# Patient Record
Sex: MALE | Race: WHITE | NOT HISPANIC OR LATINO | Employment: STUDENT | URBAN - METROPOLITAN AREA
[De-identification: names, ages, dates, MRNs, and addresses within clinical notes are randomized per-mention and may not be internally consistent; named-entity substitution may affect disease eponyms.]

---
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
## 2024-12-10 ENCOUNTER — HOSPITAL ENCOUNTER (EMERGENCY)
Facility: OTHER | Age: 24
Discharge: HOME | End: 2024-12-10
Attending: STUDENT IN AN ORGANIZED HEALTH CARE EDUCATION/TRAINING PROGRAM
Payer: COMMERCIAL

## 2024-12-10 VITALS
WEIGHT: 176 LBS | HEIGHT: 68 IN | HEART RATE: 93 BPM | TEMPERATURE: 98 F | OXYGEN SATURATION: 98 % | RESPIRATION RATE: 19 BRPM | SYSTOLIC BLOOD PRESSURE: 144 MMHG | BODY MASS INDEX: 26.67 KG/M2 | DIASTOLIC BLOOD PRESSURE: 69 MMHG

## 2024-12-10 DIAGNOSIS — R00.0 TACHYCARDIA: Primary | ICD-10-CM

## 2024-12-10 LAB
ALBUMIN SERPL BCP-MCNC: 4.7 G/DL (ref 3.5–5.2)
ALP SERPL-CCNC: 71 U/L (ref 40–150)
ALT SERPL W/O P-5'-P-CCNC: 61 U/L (ref 10–44)
ANION GAP SERPL CALC-SCNC: 13 MMOL/L (ref 8–16)
AST SERPL-CCNC: 30 U/L (ref 10–40)
BASOPHILS # BLD AUTO: 0.03 K/UL (ref 0–0.2)
BASOPHILS NFR BLD: 0.4 % (ref 0–1.9)
BILIRUB SERPL-MCNC: 0.4 MG/DL (ref 0.1–1)
BUN SERPL-MCNC: 16 MG/DL (ref 6–20)
CALCIUM SERPL-MCNC: 9.9 MG/DL (ref 8.7–10.5)
CHLORIDE SERPL-SCNC: 107 MMOL/L (ref 95–110)
CO2 SERPL-SCNC: 21 MMOL/L (ref 23–29)
CREAT SERPL-MCNC: 1 MG/DL (ref 0.5–1.4)
CTP QC/QA: YES
CTP QC/QA: YES
DIFFERENTIAL METHOD BLD: ABNORMAL
EOSINOPHIL # BLD AUTO: 0.1 K/UL (ref 0–0.5)
EOSINOPHIL NFR BLD: 1.4 % (ref 0–8)
ERYTHROCYTE [DISTWIDTH] IN BLOOD BY AUTOMATED COUNT: 12.9 % (ref 11.5–14.5)
EST. GFR  (NO RACE VARIABLE): >60 ML/MIN/1.73 M^2
GLUCOSE SERPL-MCNC: 115 MG/DL (ref 70–110)
HCT VFR BLD AUTO: 47.5 % (ref 40–54)
HGB BLD-MCNC: 17.2 G/DL (ref 14–18)
IMM GRANULOCYTES # BLD AUTO: 0.02 K/UL (ref 0–0.04)
IMM GRANULOCYTES NFR BLD AUTO: 0.3 % (ref 0–0.5)
LYMPHOCYTES # BLD AUTO: 1.9 K/UL (ref 1–4.8)
LYMPHOCYTES NFR BLD: 24.4 % (ref 18–48)
MAGNESIUM SERPL-MCNC: 1.8 MG/DL (ref 1.6–2.6)
MCH RBC QN AUTO: 30.6 PG (ref 27–31)
MCHC RBC AUTO-ENTMCNC: 36.2 G/DL (ref 32–36)
MCV RBC AUTO: 85 FL (ref 82–98)
MONOCYTES # BLD AUTO: 0.6 K/UL (ref 0.3–1)
MONOCYTES NFR BLD: 7.5 % (ref 4–15)
NEUTROPHILS # BLD AUTO: 5.1 K/UL (ref 1.8–7.7)
NEUTROPHILS NFR BLD: 66 % (ref 38–73)
NRBC BLD-RTO: 0 /100 WBC
PLATELET # BLD AUTO: 226 K/UL (ref 150–450)
PMV BLD AUTO: 11.5 FL (ref 9.2–12.9)
POC MOLECULAR INFLUENZA A AGN: NEGATIVE
POC MOLECULAR INFLUENZA B AGN: NEGATIVE
POTASSIUM SERPL-SCNC: 3.9 MMOL/L (ref 3.5–5.1)
PROT SERPL-MCNC: 8.1 G/DL (ref 6–8.4)
RBC # BLD AUTO: 5.62 M/UL (ref 4.6–6.2)
SARS-COV-2 RDRP RESP QL NAA+PROBE: NEGATIVE
SODIUM SERPL-SCNC: 141 MMOL/L (ref 136–145)
TSH SERPL DL<=0.005 MIU/L-ACNC: 1.5 UIU/ML (ref 0.4–4)
WBC # BLD AUTO: 7.65 K/UL (ref 3.9–12.7)

## 2024-12-10 PROCEDURE — 96374 THER/PROPH/DIAG INJ IV PUSH: CPT

## 2024-12-10 PROCEDURE — 93005 ELECTROCARDIOGRAM TRACING: CPT

## 2024-12-10 PROCEDURE — 63600175 PHARM REV CODE 636 W HCPCS: Performed by: STUDENT IN AN ORGANIZED HEALTH CARE EDUCATION/TRAINING PROGRAM

## 2024-12-10 PROCEDURE — 25000003 PHARM REV CODE 250: Performed by: STUDENT IN AN ORGANIZED HEALTH CARE EDUCATION/TRAINING PROGRAM

## 2024-12-10 PROCEDURE — 83735 ASSAY OF MAGNESIUM: CPT | Performed by: STUDENT IN AN ORGANIZED HEALTH CARE EDUCATION/TRAINING PROGRAM

## 2024-12-10 PROCEDURE — 96361 HYDRATE IV INFUSION ADD-ON: CPT

## 2024-12-10 PROCEDURE — 80053 COMPREHEN METABOLIC PANEL: CPT | Performed by: STUDENT IN AN ORGANIZED HEALTH CARE EDUCATION/TRAINING PROGRAM

## 2024-12-10 PROCEDURE — 99284 EMERGENCY DEPT VISIT MOD MDM: CPT | Mod: 25

## 2024-12-10 PROCEDURE — 84443 ASSAY THYROID STIM HORMONE: CPT | Performed by: STUDENT IN AN ORGANIZED HEALTH CARE EDUCATION/TRAINING PROGRAM

## 2024-12-10 PROCEDURE — 87635 SARS-COV-2 COVID-19 AMP PRB: CPT | Performed by: STUDENT IN AN ORGANIZED HEALTH CARE EDUCATION/TRAINING PROGRAM

## 2024-12-10 PROCEDURE — 85025 COMPLETE CBC W/AUTO DIFF WBC: CPT | Performed by: STUDENT IN AN ORGANIZED HEALTH CARE EDUCATION/TRAINING PROGRAM

## 2024-12-10 PROCEDURE — 93010 ELECTROCARDIOGRAM REPORT: CPT | Mod: ,,, | Performed by: INTERNAL MEDICINE

## 2024-12-10 RX ORDER — KETOROLAC TROMETHAMINE 30 MG/ML
15 INJECTION, SOLUTION INTRAMUSCULAR; INTRAVENOUS
Status: COMPLETED | OUTPATIENT
Start: 2024-12-10 | End: 2024-12-10

## 2024-12-10 RX ORDER — ACETAMINOPHEN 500 MG
1000 TABLET ORAL
Status: COMPLETED | OUTPATIENT
Start: 2024-12-10 | End: 2024-12-10

## 2024-12-10 RX ADMIN — ACETAMINOPHEN 1000 MG: 500 TABLET, FILM COATED ORAL at 06:12

## 2024-12-10 RX ADMIN — KETOROLAC TROMETHAMINE 15 MG: 30 INJECTION, SOLUTION INTRAMUSCULAR; INTRAVENOUS at 06:12

## 2024-12-10 RX ADMIN — SODIUM CHLORIDE, POTASSIUM CHLORIDE, SODIUM LACTATE AND CALCIUM CHLORIDE 1000 ML: 600; 310; 30; 20 INJECTION, SOLUTION INTRAVENOUS at 07:12

## 2024-12-10 RX ADMIN — SODIUM CHLORIDE, POTASSIUM CHLORIDE, SODIUM LACTATE AND CALCIUM CHLORIDE 1000 ML: 600; 310; 30; 20 INJECTION, SOLUTION INTRAVENOUS at 05:12

## 2024-12-10 NOTE — Clinical Note
"Jaime Moise"Arpit was seen and treated in our emergency department on 12/10/2024.  He may return to school on 12/12/2024.      If you have any questions or concerns, please don't hesitate to call.       RN"

## 2024-12-10 NOTE — ED PROVIDER NOTES
Encounter Date: 12/10/2024       History     Chief Complaint   Patient presents with    Tachycardia     Pt seen at [facility] with HR between 150-190s. Pt feels lightheaded, diaphoretic. History of tachycardia where he wore a halter monitor in the past but everything was normal.     Patient is a 24-year-old with a prior history of tachycardia, ADHD, MDD who presents with tachycardia.  Patient was in his usual state of health until approximately 1:00 p.m. this afternoon when his Apple watch notified him that his heart rate was in the 130s while resting.  He reports at this time he felt the sensation of palpitations and a mild headache.  He went on with his day in his heart rate got as high as 190s on his apple watch.  He reports feeling headaches, nauseous, chest tightness and shortness of breath when his heart rate was high.  He says he has had this happen in the past but never as high as 190s.  He has received a full cardiac workup including an outpatient cardiac monitor and echo in 2020 that did not find a reason for his episodes of tachycardia.  Patient reports taking his home dose of ADHD medication.  He denies taking an increased dose.  Patient did not take his short-acting booster medication today.  He did drink 1 energy drink which he reports is his usual caffeine intake.  He has been finals and is having increased stress but reports he has been able to eat and sleep normally.  He denies illicit drug use.    The history is provided by the patient and medical records. No  was used.     Review of patient's allergies indicates:  No Known Allergies  History reviewed. No pertinent past medical history.  History reviewed. No pertinent surgical history.  No family history on file.  Social History     Tobacco Use    Smoking status: Never    Smokeless tobacco: Never   Substance Use Topics    Alcohol use: Yes     Review of Systems   Constitutional:  Negative for fever.   HENT:  Negative  for sore throat.    Respiratory:  Positive for shortness of breath.    Cardiovascular:  Positive for chest pain.   Gastrointestinal:  Positive for nausea.   Genitourinary:  Negative for dysuria.   Musculoskeletal:  Negative for back pain.   Skin:  Negative for rash.   Neurological:  Negative for weakness.       Physical Exam     Initial Vitals [12/10/24 1708]   BP Pulse Resp Temp SpO2   (!) 104/52 (!) 152 18 98.2 °F (36.8 °C) 95 %      MAP       --         Physical Exam    Constitutional: Vital signs are normal. No distress.   HENT:   Head: Normocephalic and atraumatic.   Eyes: EOM are normal.   Neck:   Normal range of motion.  Cardiovascular:  Regular rhythm and intact distal pulses.           Tachycardic   Pulmonary/Chest: Breath sounds normal. No respiratory distress.   Abdominal: Abdomen is soft. There is no abdominal tenderness.   Musculoskeletal:         General: No edema. Normal range of motion.      Cervical back: Normal range of motion.     Neurological: He is alert and oriented to person, place, and time. GCS score is 15. GCS eye subscore is 4. GCS verbal subscore is 5. GCS motor subscore is 6.   Skin: Skin is warm and dry.         ED Course   Procedures  Labs Reviewed   CBC W/ AUTO DIFFERENTIAL - Abnormal       Result Value    WBC 7.65      RBC 5.62      Hemoglobin 17.2      Hematocrit 47.5      MCV 85      MCH 30.6      MCHC 36.2 (*)     RDW 12.9      Platelets 226      MPV 11.5      Immature Granulocytes 0.3      Gran # (ANC) 5.1      Immature Grans (Abs) 0.02      Lymph # 1.9      Mono # 0.6      Eos # 0.1      Baso # 0.03      nRBC 0      Gran % 66.0      Lymph % 24.4      Mono % 7.5      Eosinophil % 1.4      Basophil % 0.4      Differential Method Automated     COMPREHENSIVE METABOLIC PANEL - Abnormal    Sodium 141      Potassium 3.9      Chloride 107      CO2 21 (*)     Glucose 115 (*)     BUN 16      Creatinine 1.0      Calcium 9.9      Total Protein 8.1      Albumin 4.7      Total Bilirubin 0.4       Alkaline Phosphatase 71      AST 30      ALT 61 (*)     eGFR >60      Anion Gap 13     MAGNESIUM    Magnesium 1.8     TSH    TSH 1.500     SARS-COV-2 RDRP GENE    POC Rapid COVID Negative       Acceptable Yes     POCT INFLUENZA A/B MOLECULAR    POC Molecular Influenza A Ag Negative      POC Molecular Influenza B Ag Negative       Acceptable Yes       EKG Readings: (Independently Interpreted)   Sinus tachycardia, rate of 121, right axis deviation       Imaging Results    None          Medications   lactated ringers bolus 1,000 mL (1,000 mLs Intravenous New Bag 12/10/24 1939)   lactated ringers bolus 1,000 mL (0 mLs Intravenous Stopped 12/10/24 1901)   ketorolac injection 15 mg (15 mg Intravenous Given 12/10/24 1834)   acetaminophen tablet 1,000 mg (1,000 mg Oral Given 12/10/24 1833)     Medical Decision Making             ED Course as of 12/10/24 2010   Tue Dec 10, 2024   1831 Patient's heart rate is now in the 90s, normalizing after fluids.  He is still endorsing a headache, Toradol and Tylenol ordered.  Rest of patient's lab work unremarkable [KI]   1914 Patient's headache has resolved after medications.  Heart rate in the low 100s.  We will test for COVID and the flu, give additional L of fluid and likely discharge [KI]   2009 Patient workup overall unremarkable.  Patient's heart rate is in the 70s and 80s after fluids.  Patient follow up with Cardiology outpatient.  Return precautions given.  All questions answered.  Encouraged patient to limit stimulants and stress as much as possible.  Patient is in agreement with plan.  Patient will be discharged [KI]      ED Course User Index  [KI] Barbara Gonzalez MD                           Clinical Impression:  Final diagnoses:  [R00.0] Tachycardia (Primary)          ED Disposition Condition    Discharge Stable          ED Prescriptions    None       Follow-up Information       Follow up With Specialties Details Why Contact Info     Tenzin Granda MD Cardiology, Interventional Cardiology Schedule an appointment as soon as possible for a visit   [address]  [place] [address]  [phone]               Barbara Gonzalez MD  12/11/24 3149

## 2024-12-11 LAB
OHS QRS DURATION: 78 MS
OHS QTC CALCULATION: 420 MS

## 2024-12-11 NOTE — ED TRIAGE NOTES
Pt presents to ED w/ c/o high heart rate, lightheadedness, nausea, chest tightness, SOB, and headache. Pt reports his apple watch noted a high heart rate while resting so he went to the health center at [facility]. While at [facility], his heart rate was in the 150s. Pt states coming here after health center when they told him to come to ER. Pt reports drinking an energy drink today, taking his ADHD medications, and being under increased stress due to finals. AAOx4. NAD noted

## 2024-12-11 NOTE — DISCHARGE INSTRUCTIONS

## 2024-12-11 NOTE — ED NOTES
Report given by JUANA Seals. Assumed care of this patient. Received patient A&Ox4. Denies any complaints at this time. Safety measures in place; side rails up x2. Call light within pt reach. Plan of care ongoing.

## 2025-01-30 ENCOUNTER — TELEPHONE (OUTPATIENT)
Dept: ADMINISTRATIVE | Facility: OTHER | Age: 25
End: 2025-01-30
Payer: COMMERCIAL

## 2025-01-30 NOTE — TELEPHONE ENCOUNTER
Spoke to patient who declined scheduling Cardiology appointment at this time as he is considering being seen close to home. Referral will be deferred for now, and patient verbalizes understanding of this.